# Patient Record
Sex: MALE | Race: WHITE
[De-identification: names, ages, dates, MRNs, and addresses within clinical notes are randomized per-mention and may not be internally consistent; named-entity substitution may affect disease eponyms.]

---

## 2015-09-14 VITALS
SYSTOLIC BLOOD PRESSURE: 128 MMHG | DIASTOLIC BLOOD PRESSURE: 86 MMHG | SYSTOLIC BLOOD PRESSURE: 128 MMHG | SYSTOLIC BLOOD PRESSURE: 128 MMHG | DIASTOLIC BLOOD PRESSURE: 86 MMHG | SYSTOLIC BLOOD PRESSURE: 128 MMHG | DIASTOLIC BLOOD PRESSURE: 86 MMHG | DIASTOLIC BLOOD PRESSURE: 86 MMHG | SYSTOLIC BLOOD PRESSURE: 128 MMHG | DIASTOLIC BLOOD PRESSURE: 86 MMHG | DIASTOLIC BLOOD PRESSURE: 86 MMHG | DIASTOLIC BLOOD PRESSURE: 86 MMHG | DIASTOLIC BLOOD PRESSURE: 86 MMHG | DIASTOLIC BLOOD PRESSURE: 86 MMHG | DIASTOLIC BLOOD PRESSURE: 86 MMHG | SYSTOLIC BLOOD PRESSURE: 128 MMHG | SYSTOLIC BLOOD PRESSURE: 128 MMHG | DIASTOLIC BLOOD PRESSURE: 86 MMHG | SYSTOLIC BLOOD PRESSURE: 128 MMHG | DIASTOLIC BLOOD PRESSURE: 86 MMHG | SYSTOLIC BLOOD PRESSURE: 128 MMHG | DIASTOLIC BLOOD PRESSURE: 86 MMHG | DIASTOLIC BLOOD PRESSURE: 86 MMHG | DIASTOLIC BLOOD PRESSURE: 86 MMHG | SYSTOLIC BLOOD PRESSURE: 128 MMHG | DIASTOLIC BLOOD PRESSURE: 86 MMHG | SYSTOLIC BLOOD PRESSURE: 128 MMHG | SYSTOLIC BLOOD PRESSURE: 128 MMHG | SYSTOLIC BLOOD PRESSURE: 128 MMHG | SYSTOLIC BLOOD PRESSURE: 128 MMHG | SYSTOLIC BLOOD PRESSURE: 128 MMHG | DIASTOLIC BLOOD PRESSURE: 86 MMHG | SYSTOLIC BLOOD PRESSURE: 128 MMHG | SYSTOLIC BLOOD PRESSURE: 128 MMHG

## 2017-01-20 ENCOUNTER — HOSPITAL ENCOUNTER (OUTPATIENT)
Dept: HOSPITAL 61 - MRI | Age: 52
Discharge: HOME | End: 2017-01-20
Attending: PHYSICIAN ASSISTANT
Payer: COMMERCIAL

## 2017-01-20 DIAGNOSIS — M25.78: ICD-10-CM

## 2017-01-20 DIAGNOSIS — M51.27: ICD-10-CM

## 2017-01-20 DIAGNOSIS — M51.37: Primary | ICD-10-CM

## 2017-01-20 DIAGNOSIS — M47.896: ICD-10-CM

## 2017-01-20 PROCEDURE — 72148 MRI LUMBAR SPINE W/O DYE: CPT

## 2017-01-20 NOTE — RAD
PROCEDURE 

MRI lumbar spine without contrast. 

 

HISTORY 

Low back pain with left leg pain for 2 weeks 

 

TECHNIQUE 

Sagittal and axial T1 and T2 and sagittal STIR images were acquired of the

lumbar spine. 

Contrast: None 

 

COMPARISON 

September 3, 2014 

 

FINDINGS 

There is some motion degradation. Lumbar vertebral body stature and AP 

alignment are preserved. There is again mild degenerative disc disease at 

L5-S1, mild disc desiccation at L4-5. Conus terminates normally T12-L1. 

There is no significant focal marrow edema. There is mild lumbar 

levoscoliosis. There are anterior annular tears L2-3 to L4-5, posterior 

annular tear L5-S1. 

L2-3: Neural foramina and spinal canal are adequate. 

L3-4: Spinal canal and neural foramina are adequate. There is mild 

buckling of the ligamentum flavum and facet degenerative change. 

L4-5: There is again minimal disc osteophyte complex and bulge. Spinal 

canal and neural foramina are adequate. 

L5-S1: There is again shallow posterior mostly central protrusion, no 

significant impingement of the descending S1 nerve roots. Spinal canal is 

overall adequate. Neural foramina are adequate. 

 

IMPRESSION 

1. Findings are similar comparing with 2014 exam. There is again shallow 

posterior protrusion at L5-S1 without significant neural impingement or 

spinal stenosis. There is mild degenerative disc disease at L5-S1 and 

L4-5, very mild spondylosis.

 

 

 

Electronically signed by: Leandro Denny MD (Jan 20, 2017 13:34:28)

## 2017-02-10 ENCOUNTER — HOSPITAL ENCOUNTER (OUTPATIENT)
Dept: HOSPITAL 61 - PNCL | Age: 52
Discharge: HOME | End: 2017-02-10
Attending: ANESTHESIOLOGY
Payer: COMMERCIAL

## 2017-02-10 DIAGNOSIS — M51.16: Primary | ICD-10-CM

## 2017-02-10 PROCEDURE — 62323 NJX INTERLAMINAR LMBR/SAC: CPT

## 2017-02-11 NOTE — PAIN
DATE OF SERVICE:  02/10/2017



DIAGNOSES:  Lumbar radiculopathy, lumbar herniated disk and lumbar degenerative

disk disease.



HISTORY OF PRESENT ILLNESS:  The patient is a 51-year-old male who returns for a

followup status post lumbar epidural steroid injections, last seen in August 2015.  The patient did very well with these with near 80% improvement, but the

pain has returned now, over the past several weeks, he was lifting an object off

the floor, bending over forward.  When he picked it up, he felt something pop in

his back and had pain radiating to his left lower extremity as it was previously

when he lifted a bag of salt also and this aggravated the pain as well about a

week ago.  The patient reports it is across the low back into the left posterior

gluteus, posterior thigh, posterior calf, into the foot on the left side.  The

patient reports it is a 7 on a scale of 10, aching and dull with shooting pain,

radiating pain and some numbness in the leg, also some weakness with ambulation

and standing at work.  The patient reports no symptoms in the right lower

extremity currently but across the low back bilaterally.  The patient did have

an MRI scan of the lumbar spine dated 01/20/2017 showing similar finding

compared with the 2014 exam, ____ protrusion at L5-S1 without significant nerve

impingement or spinal stenosis, mild degenerative disease at L5-S1 and L4-L5

with spinal canal and neuroforamen of adequate L4-L5 shows minimal disk

osteophyte complex and bulge as well.



PHYSICAL EXAMINATION:

VITAL SIGNS:  The patient's blood pressure is 148/56, pulse 87, respirations 16,

temperature is 98.2 degrees Fahrenheit, height 6 feet 4 inches, weight 277

pounds.

GENERAL:  The patient is awake, alert, oriented, appropriate with very pleasant

demeanor.

HEENT:  Shows normocephalic, atraumatic.  Extraocular movements are intact and

symmetrical.  Oral cavity, mucous membranes moist and pink.  Dentition intact.

NECK:  Supple, without palpable lymphadenopathy.  Swallow reflexes are

symmetrical.

CHEST:  Shows normal infection, breath sounds are clear to auscultation

bilaterally.

CARDIOVASCULAR:  S1, S2, clear.

ABDOMEN:  The patient's abdomen is obese, soft, nontender, nondistended.  No

hepatosplenomegaly, no rebound or guarding demonstrated.

BACK:  Spine grossly midline.  Normal appearing thoracic kyphosis and lumbar

lordotic curvature.  Lumbar paraspinal musculature shows symmetrical on

inspection with some moderate tenderness in the middle and lower distribution of

paraspinous muscles bilaterally but symmetrical without evidence of atrophy or

hypertrophy.  No tenderness over the spinous processes.  No tenderness over the

sacrum or sacroiliac regions.  The patient showed good rotation of motion in

lumbar spine both laterally greater than 10 degrees right and left as well as

extension greater than 10 degrees, forward flexion 45 degrees without

difficulty.

EXTREMITIES:  The lower extremities showed deep tendon reflexes 2+ in the

patella and 1+ tendo calcaneus tendons.  Motor exam is strong with 5/5

dorsiflexion and extension and equal bilaterally as quadriceps and hamstrings

flexion.  Peripheral pulses are 1+, posterior tibial and dorsalis pedis pulses. 

No peripheral edema is noted.  No clubbing, no cyanosis.  Lower extremities are

warm and dry to touch.  Equal in color and appearance.  Straight leg raising is

noted to be positive on the left at about 35 to 40 degrees with pain radiating

to posterior gluteus, posterior thigh, which has decreased but no really knee

flexion right side is negative.  ____ are negative bilaterally.  The patient is

able to stand, stand on his toes without difficulty, has no loss of balance.  He

is walking with a normal-appearing gait for short distances ____ not using any

assistive devices.  Options were discussed with the patient.  The patient's old

chart was reviewed, as his current medication regimen and updated.



REVIEW OF SYSTEMS:  Current review of systems updated today as well.  We will

proceed with a lumbar epidural steroid injection.  He has done very well with

these in the past.  Risks were again discussed including but not limited to

bleeding, infection, possibility of epidural hematoma, subsequent neurologic

compromise, dural puncture headaches, spinal cord and/or nerve damage, side

effects of steroid medications and poor results regarding pain control.  The

patient understands and wishes to proceed.  He should return to clinic in

approximately 2 weeks for followup.  He was counseled on return appointment,

activity level and side effects to be aware of.



DIAGNOSES:  Lumbar radiculopathy with lumbar herniated disk and lumbar

degenerative disk disease.



PROCEDURE:  Lumbar epidural steroid injection, translaminar approach to the

L5-S1 level using C-arm fluoroscopic guidance under sterile prep and drape using

local anesthetic.  



MEDICATIONS INJECTED:  120 mg Depomedrol, plus 10 mL of preservative free normal

saline and 2 mL of Isovue contrast.



Condition at discharge is stable.  The patient tolerated procedure well, had no

complications.

 



______________________________

CINTIA LAGUNA MD



DR:  NEDA/eber  JOB#:  [id number] / [id number]

DD:  02/10/2017 08:10  DT:  02/11/2017 09:53

## 2017-02-24 ENCOUNTER — HOSPITAL ENCOUNTER (OUTPATIENT)
Dept: HOSPITAL 61 - PNCL | Age: 52
End: 2017-02-24
Attending: ANESTHESIOLOGY
Payer: COMMERCIAL

## 2017-02-24 DIAGNOSIS — M51.16: Primary | ICD-10-CM

## 2017-02-24 PROCEDURE — 62323 NJX INTERLAMINAR LMBR/SAC: CPT

## 2017-02-25 NOTE — PAIN
DATE OF SERVICE:  02/24/2017



PROGRESS NOTE



DIAGNOSES:  Lumbar radiculopathy with lumbar herniated disc and lumbar

degenerative disc disease.



HISTORY OF PRESENT ILLNESS:  The patient is a 51-year-old male who returns for

followup status post lumbar epidural steroid injection x 1.  The patient reports

about 40% improvement after the injection.  Has still some pain in the low back

and left leg, but much improved.  The patient is increasing his activity with

greater ease and comfort.  Still has some pain at the end of his working day,

but during the day, he is doing fairly well.  The patient reports his pain as a

7 on a scale of 10 at its worst, but it is only at the end of the day and has

only been present for about 3 or 4 days at that level, as he did very well for

the first week and a half or so after his injection.  The patient reports no new

motor or sensory deficits, no new bowel or bladder incontinence or other

complaints.  He described his pain as sharp, alternating with dull and aching,

radiating to the left posterior gluteus, posterior lateral thigh, posterior calf

as previously.



PHYSICAL EXAMINATION:

VITAL SIGNS:  Today, the patient's blood pressure is 141/83, pulse 90,

respirations 18, temperature 98.0 degrees Fahrenheit, height 6 feet 4 inches,

weight is 270 pounds.

GENERAL:  The patient is awake, alert, oriented, appropriate, very pleasant

demeanor.

HEENT:  Shows normocephalic and atraumatic.  Extraocular movements are intact

and symmetrical.  Oral cavity shows mucous membranes are moist and pink. 

Dentition is intact.

NECK:  Shows anterior throat supple without palpable lymphadenopathy noted. 

Swallow reflex is symmetrical.

CHEST:  Shows normal with inspection.  Breath sounds are clear to auscultation

bilaterally.

HEART:  Shows S1 and S2 clear.  No murmurs auscultated.

ABDOMEN:  Soft, nontender, nondistended.

BACK:  Shows spine grossly midline.  Lumbar paraspinous muscle shows

symmetrical.  No significant tenderness with palpation, only diffusely tender in

the low lumbar distribution bilaterally without radiation.  The patient shows

full rotational motion of the lumbar spine, both laterally as well as extension

and flexion without difficulty.  No tenderness over the sacrum and sacroiliac

regions with palpation.

EXTREMITIES:  Lower extremities show deep tendon reflexes at 2+ in the patellar,

1+ tendo-calcaneus tendons, are equal.  Motor exam is strong with 5/5

dorsiflexion, extension, quadriceps and hamstring flexion bilaterally.



PLAN:  Options were discussed with the patient at this time, the patient's old

chart was reviewed as his current medication regimen and updated.  Current

review of systems updated today as well.  We will proceed with a second lumbar

epidural steroid injection today with fluoroscopic guidance.  Risks were again

discussed including but not limited to bleeding, infection, possibility of

epidural hematoma, subsequent neurologic compromise, dural puncture, headaches,

spinal cord and/or nerve damage, side effects of steroid medication and poor

results regarding pain control.  The patient understands and wishes to proceed. 

The patient will return to clinic in approximately 2 weeks for followup.  He is

counseled on return appointment, activity level, and side effects to be aware

of.



DIAGNOSES:  Lumbar radiculopathy with lumbar herniated disc and lumbar

degenerative disk disease.



PROCEDURES:  Lumbar epidural steroid injection in translaminar approach at the

L5-S1 level using C-arm fluoroscopic guidance under sterile prep and drape using

local anesthetic.



MEDICATIONS INJECTED:  Depo-Medrol 120 mg plus 10 mL of preservative-free normal

saline and 2 mL of Isovue for contrast.



CONDITION AT DISCHARGE:  Stable.  The patient tolerated the procedure well, had

no complications.

 



______________________________

CINTIA LAGUNA MD



DR:  NEDA/nts  JOB#:  [id number] / [id number]

DD:  02/24/2017 08:09  DT:  02/25/2017 03:33

## 2017-04-05 ENCOUNTER — HOSPITAL ENCOUNTER (OUTPATIENT)
Dept: HOSPITAL 63 - DXRADRC | Age: 52
Discharge: HOME | End: 2017-04-05
Attending: PHYSICIAN ASSISTANT
Payer: COMMERCIAL

## 2017-04-05 DIAGNOSIS — M25.562: Primary | ICD-10-CM

## 2017-04-05 PROCEDURE — 73562 X-RAY EXAM OF KNEE 3: CPT

## 2017-04-05 NOTE — RAD
Left knee radiographs 



History:  Left knee pain for one day, no known injury.



Comparison:  None.



Findings:  



AP, lateral, and oblique views of the left knee, performed weightbearing. No

acute fracture or dislocation is identified. No joint effusion is seen.

Minimal tricompartment degeneration is present.



Impression:  

Minimal degeneration. No acute osseous abnormality identified.

## 2017-04-21 ENCOUNTER — HOSPITAL ENCOUNTER (OUTPATIENT)
Dept: HOSPITAL 61 - PNCL | Age: 52
Discharge: HOME | End: 2017-04-21
Attending: ANESTHESIOLOGY
Payer: COMMERCIAL

## 2017-04-21 DIAGNOSIS — M51.16: Primary | ICD-10-CM

## 2017-04-21 PROCEDURE — 62323 NJX INTERLAMINAR LMBR/SAC: CPT

## 2017-04-22 NOTE — PN
DATE:  04/21/2017



PROGRESS NOTE FOR PAIN CLINIC



DIAGNOSES:  Lumbar radiculopathy with lumbar herniated disk, lumbar degenerative

disk disease.



HISTORY OF PRESENT ILLNESS:  The patient is a 52-year-old male who returns for

followup status post lumbar epidural steroid injections x 2.  The patient

reports about 40% improvement overall, was initially doing much better, but the

pain is returning now in the low back and into his left lower extremity,

initially it was the right one, the right has cleared up now with significant

pain in his left leg as he had on his last visit.  The patient reports anywhere

from a 5 to 8 on scale of 10, it is burning, it is sharp, aching and dull, worse

with standing and walking, changing positions, better with sitting or lying

down, still sleeping well though without any difficulty sleeping through the

night.  The patient reports no new motor or sensory deficits, no new bowel or

bladder incontinence or other complaints, but still significant pain in the low

back, left leg, mostly in the posterior gluteus, posterior thigh, posterior

lower leg to the ankle on the left side only.



PHYSICAL EXAMINATION:

VITAL SIGNS:  Today, the patient's blood pressure is 129/79, pulse 90,

respirations 20, temperature 97.8 degrees Fahrenheit, height 6 feet 4 inches,

weighs 276 pounds.

GENERAL:  The patient is awake, alert, oriented, appropriate, has a very

pleasant demeanor.

HEENT:  Head shows normocephalic, atraumatic.  Extraocular movements are intact,

symmetrical.  Oral cavity has mucous membranes moist and pink.  Dentition is

intact.

NECK:  Shows anterior throat supple without palpable lymphadenopathy noted. 

Swallow reflex is symmetrical.

CHEST:  Shows normal on inspection.  Breath sounds are clear to auscultation

bilaterally.

HEART:  Shows S1 and S2 clear.

ABDOMEN:  Soft, nontender, nondistended.  No palpable organomegaly is noted.  No

rebound or guarding demonstrated.

BACK:  The patient's back shows spine grossly in the midline.  Lumbar

paraspinous muscle shows some moderate tenderness with palpation bilaterally in

the lower lumbar distribution, but only diffusely without atrophy, hypertrophy

or asymmetry.  No radiation of pain, no tenderness over the sacrum or sacroiliac

regions.  The patient shows good rotational motion of the lumbar spine, both

laterally greater than 10 degrees right and left as well as extension greater

than 10 degrees, forward flexion 45 degrees without exacerbation of pain.

EXTREMITIES:  The patient's lower extremities showed deep tendon reflexes at 2+

in the patellar, 1+ tendo-calcaneus tendons are symmetrical.  Motor exam is

strong with 5/5 dorsiflexion and extension as well as quadriceps and hamstring

flexion and symmetrical.



Options were discussed with the patient and the patient's old chart was reviewed

as his current medication regimen updated.  Current review of systems updated

today as well and we will proceed with a third in the series of lumbar epidural

steroid injection with fluoroscopic guidance.  Risks were again discussed

including, but not limited to bleeding, infection, possibility of epidural

hematoma, subsequent neurologic compromise, dural puncture, headaches, spinal

cord and/or nerve damage, side effects of steroid medication and poor results

regarding pain control.  The patient understands and wishes to proceed.  The

patient will return to clinic in approximately 2 weeks for followup, was

counseled on return appointment, activity level and side effects to be aware of.

 We also discussed physical therapy and weight loss with the patient.  He is

interested in both these.  We will make some arrangements for some pool therapy,

also some weight loss training resources were shared with him.



DIAGNOSIS:  Lumbar radiculopathy with lumbar degenerative disk disease and

lumbar herniated disk.



PROCEDURE:  Lumbar epidural steroid injection in translaminar approach at L5-S1

level using C-arm fluoroscopic guidance under sterile prep and drape using local

anesthetic.  Medication injected is 120 mg Depo-Medrol plus 10 mL of

preservative-free normal saline and 2 mL Isovue for contrast.



CONDITION AT DISCHARGE:  Stable.  The patient tolerated procedure well, had no

complications.

 



______________________________

CINTIA LAGUNA MD



DR:  NEDA/eber  JOB#:  [id number] / [id number]

DD:  04/21/2017 08:41  DT:  04/22/2017 00:20

## 2019-02-06 ENCOUNTER — HOSPITAL ENCOUNTER (OUTPATIENT)
Dept: HOSPITAL 61 - PNCL | Age: 54
Discharge: HOME | End: 2019-02-06
Attending: ANESTHESIOLOGY
Payer: COMMERCIAL

## 2019-02-06 DIAGNOSIS — M51.16: Primary | ICD-10-CM

## 2019-02-06 PROCEDURE — 62323 NJX INTERLAMINAR LMBR/SAC: CPT

## 2019-02-06 NOTE — PAIN
DATE OF SERVICE:  02/06/2019



PROGRESS NOTE FOR PAIN CLINIC



DIAGNOSES:  Lumbar radiculopathy with lumbar herniated disk and lumbar

degenerative disk disease.



HISTORY OF PRESENT ILLNESS:  The patient is a 53-year-old male who returns for

followup status post lumbar epidural steroid injections x 3, last seen in April of 2017.  The patient did very well with about a 75% improvement.  The patient

reports the pain is returning now over the past few months in the low back,

bilateral lower extremities, mostly in the left, but in the posterior gluteus,

posterior thighs bilaterally to the knees and into the posterior calf, some on

the lateral right side as well.  The patient reports it is worse with walking,

standing, change in positions, initially with increasing activity, walking,

doing work activities, household activities, traveling better, sleeping better,

reports it awakens him from sleep about every 5-6 hours now.  The patient

reports pain is 9 on a scale of 10 at its worst, 8 on average, 6 at its least. 

Described as sharp and constant, becoming more unbearable, radiating, shooting

and aching pain across the back.  The patient reports no new motor or sensory

deficits and no new bowel or bladder incontinence or other complaints.



PHYSICAL EXAMINATION:

VITAL SIGNS:  The patient's blood pressure 131/99, pulse 87, respirations 18 and

temperature 97.6 degrees Fahrenheit.  Height 6 feet 4 inches and weight is 271

pounds.

GENERAL:  The patient is awake, alert, oriented, appropriate and very pleasant

demeanor.

HEENT:  Head shows normocephalic and atraumatic.  Extraocular movements are

intact and symmetrical.  Oral cavity:  Mucous membranes moist and pink. 

Dentition is intact.

NECK:  Shows anterior throat supple without palpable lymphadenopathy noted. 

Swallow reflex symmetrical.

CHEST:  Shows normal with inspection.  Breath sounds clear to auscultation

bilaterally.

HEART:  Shows S1 and S2 clear.  No murmurs auscultated.

ABDOMEN:  Soft, nontender and nondistended.  No palpable organomegaly is noted. 

No rebound or guarding demonstrated.

BACK:  Shows spine grossly in the midline.  Normal appearing thoracic kyphosis

and lumbar lordotic curvature.  Lumbar paraspinous muscle shows symmetrical on

inspection and on palpation shows some moderate tenderness diffusely bilaterally

but only diffusely without radiation.  The patient has good rotational motion of

the lumbar spine, both laterally greater than 10 degrees right and left as well

as extension greater than 10 degrees, forward flexion 45 degrees without

significant pain reported.

EXTREMITIES:  The patient's lower extremities show deep tendon reflexes 2+ in

the patellar, 1+ tendo-calcaneus tendons.  Motor exam is strong with 5/5

dorsiflexion, extension, quadriceps and hamstring flexion equal.  Peripheral

pulses are 1+ posterior tibial.  No peripheral edema is noted bilaterally.



Options were discussed with the patient.  The patient's old chart was reviewed

as well as his current medication regimen updated.  Current review of systems

updated today as well and we will proceed with a first in the series of lumbar

epidural steroid injection today with fluoroscopic guidance.  Risks were again

discussed including, but not limited to bleeding, infection, possibility of

epidural hematoma, subsequent neurological compromise, dural puncture,

headaches, spinal cord and/or nerve damage, side effects of steroid medication

and poor results regarding pain control.  The patient understands and wished to

proceed.  The patient will return to the clinic in approximately 2 weeks for

followup, was counseled as to return appointment, activity level and side

effects to be aware of.



DIAGNOSES:  Lumbar radiculopathy with lumbar herniated disk and lumbar

degenerative disk disease.  



PROCEDURE:  Lumbar epidural steroid injection, translaminar approach, L5-S1

level using C-arm fluoroscopic guidance under sterile prep and drape using local

anesthetic.



MEDICATION INJECTED:  A total of 120 mg Depo-Medrol plus 10 mL of

preservative-free normal saline and 2 mL of Isovue for contrast. 



CONDITION AT DISCHARGE:  Stable.  The patient tolerated procedure well and had

no complications.

 



______________________________

CINTIA LAGUNA MD



DR:  NEDA/eber  JOB#:  [id number] / [id number]

DD:  02/06/2019 08:27  DT:  02/06/2019 09:01

## 2019-02-27 ENCOUNTER — HOSPITAL ENCOUNTER (OUTPATIENT)
Dept: HOSPITAL 61 - PNCL | Age: 54
Discharge: HOME | End: 2019-02-27
Attending: ANESTHESIOLOGY
Payer: COMMERCIAL

## 2019-02-27 DIAGNOSIS — M51.16: Primary | ICD-10-CM

## 2019-02-27 PROCEDURE — 62323 NJX INTERLAMINAR LMBR/SAC: CPT

## 2019-02-27 NOTE — PAIN
DATE OF SERVICE:  02/27/2019



DIAGNOSES:  Lumbar radiculopathy with lumbar degenerative disk disease and

lumbar herniated disk.



HISTORY OF PRESENT ILLNESS:  The patient is a 53-year-old male who returns for

followup status post lumbar epidural steroid injection x 1.  The patient reports

about 40% improvement overall, doing much better with this.  Pain is low back,

bilateral lower extremities.  Pain is still radiating into the posterior

gluteus, posterior thighs bilaterally as well as lateral anterior thigh.  The

patient reports it is a 6 on a scale 10 at its least, a 7 on its worst, 7 on

average.  The patient reports aching, dull, shooting, radiating pain, worse with

standing and walking, again much better after the last injection.  The patient

reports for several weeks, he was doing better with work activities, doing

greater distance walking, household activity as well and sleeping much better. 

The patient reports it does not awaken him from sleep now, which it was

previously.  The patient reports no new motor or sensory deficits, no new bowel

or bladder incontinence or other complaints.  The patient is quite pleased with

his progress thus far.  He has been increasing his activity, although the pain

is beginning to return slightly and still about 40%, overall improved.



PHYSICAL EXAMINATION:

VITAL SIGNS:  The patient's blood pressure 119/81, pulse 87, respirations 16,

temperature is 97.5 degrees, height 6 feet 4 inches, weight is 274 pounds.

GENERAL:  The patient is awake, alert, oriented, appropriate, very pleasant

demeanor.

HEENT:  Head shows normocephalic, atraumatic.  Extraocular muscles are intact

and symmetrical.  Oral cavity:  Mucous membranes moist and pink.  Dentition is

intact.

NECK:  Shows anterior throat supple without palpable lymphadenopathy noted. 

Swallow reflex is symmetrical.

CHEST:  Shows normal on inspection.  Breath sounds clear to auscultation

bilaterally.

HEART:  Shows S1, S2 clear.  No murmurs auscultated.

ABDOMEN:  Soft, nontender, nondistended.  No palpable organomegaly is noted.  No

rebound or guarding demonstrated.

BACK:  Shows spine grossly in the midline.  Normal appearing thoracic kyphosis

and lumbar lordotic curvature.  Lumbar paraspinous musculature shows symmetrical

on inspection, on palpation shows some moderate tenderness but only diffusely

throughout the middle and lower distribution of paraspinous muscles without

radiation.  No trigger points.  The patient has good rotation both laterally as

well as extension and flexion without difficulty.

EXTREMITIES:  Lower extremities show deep tendon reflexes at 2+ in the patellar,

1+ tendo calcaneus tendons.  Motor exam is strong with 5/5 dorsiflexion,

extension, quadriceps and hamstring flexion and equal.  Peripheral pulses are 1+

posterior tibial.  No peripheral edema is noted.



Options were discussed with the patient.  The patient's old chart was reviewed

as his current medication regimen and updated.  Current review of systems is

updated today as well.  We will proceed with a second in the series of lumbar

epidural steroid injection today with fluoroscopic guidance.  Risks were again

discussed including, but not limited to bleeding, infection, possibility of

epidural hematoma, subsequent neurological compromise, dural puncture,

headaches, spinal cord and/or nerve damage, side effects of steroid medication

and poor results regarding pain control.  The patient understands and wished to

proceed.  The patient to return to clinic in approximately 2 weeks for followup,

was counseled on return appointment, activity level and side effects to be aware

of.



DIAGNOSES:  Lumbar radiculopathy with lumbar degenerative disk disease, lumbar

herniated disk.



PROCEDURE:  Lumbar epidural steroid injection, translaminar approach at L5-S1

level using C-arm fluoroscopic guidance under sterile prep and drape using local

anesthetic.



MEDICATION INJECTED:  A total of 120 mg Depo-Medrol plus 10 mL of preservative

free normal saline, 2 mL of Isovue for contrast.



CONDITION AT DISCHARGE:  Stable.  The patient tolerated procedure well, had no

complications.

 



______________________________

CINTIA LAGUNA MD



DR:  NEDA/eber  JOB#:  [id number] / [id number]

DD:  02/27/2019 08:23  DT:  02/27/2019 19:34

## 2019-07-18 ENCOUNTER — HOSPITAL ENCOUNTER (OUTPATIENT)
Dept: HOSPITAL 63 - PMG | Age: 54
Discharge: HOME | End: 2019-07-18
Payer: COMMERCIAL

## 2019-07-18 DIAGNOSIS — K56.41: Primary | ICD-10-CM

## 2019-07-18 PROCEDURE — 74019 RADEX ABDOMEN 2 VIEWS: CPT

## 2019-07-18 NOTE — RAD
AP supine and upright views of the abdomen

 

Clinical indications: Abdominal pain.

 

FINDINGS: There is moderate fecal retention within the right side of the 

colon. Mild fecal retention is seen with left side of the colon. No 

obstructive bowel pattern is evident. No air-fluid levels are seen. No 

free intraperitoneal air is seen.

 

IMPRESSION: No acute abnormality.

 

Electronically signed by: Vincent Fuchs MD (7/18/2019 4:21 PM) [id number]

## 2019-12-10 ENCOUNTER — HOSPITAL ENCOUNTER (OUTPATIENT)
Dept: HOSPITAL 61 - PNCL | Age: 54
End: 2019-12-10
Attending: ANESTHESIOLOGY
Payer: COMMERCIAL

## 2019-12-10 DIAGNOSIS — M51.16: Primary | ICD-10-CM

## 2019-12-10 PROCEDURE — 62323 NJX INTERLAMINAR LMBR/SAC: CPT

## 2019-12-10 NOTE — PAIN
DATE OF SERVICE:  12/10/2019



PROGRESS NOTE FOR PAIN CLINIC



DIAGNOSES:  Lumbar radiculopathy with lumbar herniated disk, lumbar degenerative

disk disease.



HISTORY OF PRESENT ILLNESS:  The patient is a 54-year-old male who returns for

followup status post lumbar epidural steroid injections x 3, last seen on

05/28/2019.  The patient did very well with about 50% improvement overall.  The

patient reports the pain began to return after about the past month or month and

a half in the low back and bilateral lower extremities, posterior gluteus,

radiating to posterior thighs, posterior calves, slightly worse on the left than

the right, but present bilaterally.  The patient reports it is an 8 on a scale

of 10 at its worst, 6 on average, 4 at its least over the past week and is a 4

today.  The patient reports it is aching, sharp, tingling, radiating, worse with

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 

He is driving daily, which exacerbated the pain.  The patient reports meloxicam

helped significantly.  He is requesting a refill of this as well.  The patient

reports no new motor or sensory deficits, no new bowel or bladder incontinence

or other complaints.



PHYSICAL EXAMINATION:

VITAL SIGNS:  The patient's blood pressure 125/80, pulse 74, respirations 18,

temperature 97.6 degrees Fahrenheit, height is 6 feet 4 inches, weight is 270

pounds.

GENERAL:  The patient is awake, alert, oriented, appropriate, very pleasant

demeanor.

HEENT:  Shows normocephalic, atraumatic.  Extraocular movements are intact and

symmetrical.  Oral cavity:  Mucous membranes moist and pink.  Dentition is

intact.

NECK:  Shows anterior throat supple without palpable lymphadenopathy noted. 

Swallow reflex symmetrical.

CHEST:  Shows normal on inspection.  Breath sounds clear to auscultation

bilaterally.

HEART:  Shows S1, S2 clear.  No murmurs auscultated.

ABDOMEN:  Soft, nontender, nondistended.  No palpable organomegaly is noted.  No

rebound or guarding demonstrated.

BACK:  Shows spine grossly in the midline.  Normal appearing thoracic kyphosis,

some minor flattening of lumbar lordotic curvature.  Lumbar paraspinous muscle

shows symmetrical on inspection; with palpation, shows some moderate tenderness

diffusely bilaterally, diffusely without significant radiation.

EXTREMITIES:  The patient's lower extremities show deep tendon reflexes at 2+

patellar, 1+ tendo-calcaneus tendons.  Motor exam is strong with 5/5

dorsiflexion, extension, quadriceps and hamstring flexion and equal bilaterally.

 Peripheral pulses are 1+ posterior tibia.  No peripheral edema is noted

bilaterally.



Options were discussed with the patient.  The patient's old chart was reviewed

as his current medication regimen updated.  Current review of systems updated

today as well.  We will proceed with a lumbar epidural steroid injection today

with fluoroscopic guidance.  Risks were again discussed including, but not

limited to bleeding, infection, possibility of epidural hematoma, subsequent

neurological compromise, dural puncture, headaches, spinal cord and/or nerve

damage, side effects of steroid medication and poor results regarding pain

control.  The patient understands and wished to proceed.  The patient will

return to clinic in approximately 2 weeks for followup.  He was counseled on

return appointment, activity level and side effects to be aware of.



DIAGNOSES:  Lumbar radiculopathy with lumbar degenerative disk disease and

lumbar herniated disk.



PROCEDURE:  Lumbar epidural steroid injection, translaminar approach L5-S1 level

using C-arm fluoroscopic guidance under sterile prep and drape using local

anesthetic.



MEDICATION INJECTED:  A total of 120 mg Depo-Medrol plus 10 mL of

preservative-free normal saline and 2 mL of contrast.



CONDITION AT DISCHARGE:  Stable.  The patient tolerated the procedure well, had

no complications.

 



______________________________

CINTIA LAGUNA MD



DR:  NEDA/eber  JOB#:  [id number] / [id number]

DD:  12/10/2019 08:50  DT:  12/10/2019 09:04

## 2019-12-26 ENCOUNTER — HOSPITAL ENCOUNTER (OUTPATIENT)
Dept: HOSPITAL 61 - PNCL | Age: 54
End: 2019-12-26
Attending: ANESTHESIOLOGY
Payer: COMMERCIAL

## 2019-12-26 DIAGNOSIS — M51.16: Primary | ICD-10-CM

## 2019-12-26 PROCEDURE — 62323 NJX INTERLAMINAR LMBR/SAC: CPT

## 2019-12-26 NOTE — PAIN
DATE OF SERVICE:  12/26/2019



PROGRESS NOTE FOR PAIN CLINIC



DIAGNOSES:  Lumbar radiculopathy with lumbar herniated disk, lumbar degenerative

disk disease.



HISTORY OF PRESENT ILLNESS:  The patient is a 54-year-old male who returns for

followup status post lumbar epidural steroid injection x 1 in this series.  The

patient reports it is about 25% improvement overall in the low back, bilateral

lower extremities.  The patient reports the pain is more noticeable now on the

left side, the posterior gluteus, posterior thigh and posterior calf, radiating

some on the right as well and across the low back, but mostly on the left side. 

The patient reports it is worse with walking, standing or changing positions, is

better with sitting or lying down initially, though he is doing much better with

walking distances, doing work activities, household activities, sleeping better

at night.  Reports it does not awaken him from sleep at night.  The patient

describes the pain as burning in the back, aching and shooting in the lower

extremities as described.  The patient reports it is a 9 on a scale of 10 at its

worst in the past week, 7 on average, 5 at its least and is a 7 today.  The

patient reports no new motor or sensory deficits, no new bowel or bladder

incontinence or other complaints.



PHYSICAL EXAMINATION:

VITAL SIGNS:  The patient's blood pressure is 121/79, pulse 86, respirations 18,

temperature 98.1 degrees Fahrenheit, height 6 feet 4 inches, weight is 277

pounds.

GENERAL:  The patient is awake, alert, oriented, appropriate, very pleasant

demeanor.

HEENT:  Head shows normocephalic, atraumatic.  Extraocular movements are intact

and symmetrical.  Oral cavity shows mucous membranes moist and pink.  Dentition

is intact.

NECK:  Shows anterior throat supple without palpable lymphadenopathy noted. 

Swallow reflex symmetrical.

CHEST:  Shows normal on inspection.  Breath sounds are clear bilaterally.

HEART:  Shows S1, S2 clear.  No murmurs auscultated.

ABDOMEN:  Soft, nontender, nondistended.  No palpable organomegaly is noted.  No

rebound or guarding demonstrated.

BACK:  Shows spine grossly in the midline.  Normal-appearing thoracic kyphosis,

some minor flattening of lumbar lordotic curvature.  Lumbar paraspinous muscle

shows symmetrical on inspection, with palpation shows some moderate tenderness

diffusely, but only diffusely without significant radiation.  The patient shows

good rotational motion of lumbar spine without difficulty.

EXTREMITIES:  Lower extremities show deep tendon reflexes at 2+ in patella, 1+

tendo-calcaneus tendons.  Motor exam is strong and 5/5 in dorsiflexion,

extension, quadriceps, hamstring flexion and symmetrical.  Peripheral pulses 1+

in posterior tibia.  No peripheral edema is noted.



Options were discussed with the patient.  The patient's old chart was reviewed

as his current medication regimen updated.  Current review of systems updated

today as well.  We will proceed with a second in the series of lumbar epidural

steroid injection today with fluoroscopic guidance.  Risks were again discussed

including, but not limited to bleeding, infection, possibility of epidural

hematoma, subsequent neurological compromise, dural puncture, headaches, spinal

cord and/or nerve damage, side effects of steroid medication and poor results

regarding pain control.  The patient understands and wished to proceed.  The

patient will return to clinic in approximately 2 weeks for followup, was

counseled on return appointment, activity level and side effects to be aware of.



DIAGNOSES:  Lumbar radiculopathy with lumbar herniated disk, lumbar degenerative

disk disease.



PROCEDURE:  Lumbar epidural steroid injection, translaminar approach L5-S1 level

using C-arm fluoroscopic guidance under sterile prep and drape using local

anesthetic.



MEDICATION INJECTED:  A total of 120 mg Depo-Medrol plus 10 mL of

preservative-free normal saline and 2 mL of contrast.



CONDITION AT DISCHARGE:  Stable.  The patient tolerated procedure well, had no

complications.

 



______________________________

CINTIA LAGUNA MD



DR:  NEDA/eber  JOB#:  [id number] / [id number]

DD:  12/26/2019 08:56  DT:  12/26/2019 09:11

## 2020-02-28 ENCOUNTER — HOSPITAL ENCOUNTER (OUTPATIENT)
Dept: HOSPITAL 63 - PMG | Age: 55
Discharge: HOME | End: 2020-02-28
Attending: NURSE PRACTITIONER
Payer: COMMERCIAL

## 2020-02-28 DIAGNOSIS — M79.601: Primary | ICD-10-CM

## 2020-02-28 PROCEDURE — 73090 X-RAY EXAM OF FOREARM: CPT

## 2020-02-28 PROCEDURE — 73060 X-RAY EXAM OF HUMERUS: CPT

## 2020-02-28 NOTE — RAD
Indications: Right arm for 5 days. No known injury.

 

Two-view right humerus study: No acute fracture or dislocation or lytic 

process is seen.

 

Two-view right forearm study: No acute fracture or dislocation or lytic 

process is seen.

 

IMPRESSION: No acute osseous abnormality.

 

Electronically signed by: Vincent Fuchs MD (2/28/2020 1:38 PM) [id number]

## 2020-02-28 NOTE — RAD
Indications: Right arm for 5 days. No known injury.

 

Two-view right humerus study: No acute fracture or dislocation or lytic 

process is seen.

 

Two-view right forearm study: No acute fracture or dislocation or lytic 

process is seen.

 

IMPRESSION: No acute osseous abnormality.

 

Electronically signed by: Vincent Fuchs MD (2/28/2020 1:38 PM) [id number]

## 2020-03-18 ENCOUNTER — HOSPITAL ENCOUNTER (OUTPATIENT)
Dept: HOSPITAL 61 - KCIC MRI | Age: 55
Discharge: HOME | End: 2020-03-18
Attending: ORTHOPAEDIC SURGERY
Payer: COMMERCIAL

## 2020-03-18 DIAGNOSIS — M77.11: Primary | ICD-10-CM

## 2020-03-18 PROCEDURE — 73221 MRI JOINT UPR EXTREM W/O DYE: CPT

## 2020-03-18 NOTE — KC[facility]
EXAM: MRI RIGHT ELBOW

 

DATE: 3/18/2020 3:30 PM

 

CLINICAL INDICATION: Lateral right elbow pain

 

COMPARISON: None.

 

TECHNIQUE: Multiplanar, multisequence MR imaging of the right elbow was 

performed without IV contrast.

 

 

FINDINGS:

No significant elbow joint effusion.

 

T1 marrow signal is grossly preserved. No evidence for fracture or 

osteonecrosis. Articular cartilage is grossly preserved without definite 

full-thickness cartilage defect

 

Common flexor tendon origin is grossly normal in signal and morphology. 

The ulnar collateral ligament is intact.

 

There is mild thickening and increased signal within the common extensor 

tendon origin as well as overlying soft tissues. Mild edema is also seen 

within the likely extensor digitorum muscle. The radial collateral 

ligament and lateral ulnar collateral ligament are grossly intact. Annular

ligament is not well seen.

 

Biceps and brachialis tendons are intact with normal signal and morphology

without significant bicipitoradial bursal fluid.

 

Triceps tendon is intact with normal signal and morphology. 

 

IMPRESSION:

1.  Changes of lateral epicondylitis with tendinosis of the common 

extensor tendon group and mild associated soft tissue edema. Edema within 

the extensor digitorum muscle likely low-grade strain.

 

Electronically signed by: Danie Montemayor MD (3/18/2020 4:53 PM) [id number]

## 2020-05-28 ENCOUNTER — HOSPITAL ENCOUNTER (OUTPATIENT)
Dept: HOSPITAL 61 - PNCL | Age: 55
End: 2020-05-28
Attending: ANESTHESIOLOGY
Payer: COMMERCIAL

## 2020-05-28 DIAGNOSIS — M51.16: Primary | ICD-10-CM

## 2020-05-28 PROCEDURE — 62323 NJX INTERLAMINAR LMBR/SAC: CPT

## 2020-05-28 NOTE — PAIN
DATE OF SERVICE:  05/28/2020



PROGRESS NOTE FOR PAIN CLINIC



DIAGNOSES:  Lumbar radiculopathy with lumbar herniated disk, lumbar degenerative

disk disease.



HISTORY OF PRESENT ILLNESS:  The patient is a 55-year-old male who returns for

followup status post lumbar epidural steroid injections x 2, most recently

12/26/2019.  The patient did very well with the last injection.  He reports

overall about 30% improvement to this date, still improved, more significantly

improved early on, but now the pain is returning in the low back, bilateral

lower extremities, posterior gluteus, posterior thighs, posterior calves, worse

on the right than the left at this time and with some right knee pain reported

as well.  The patient reports the pain is burning and sharp in the back,

radiating and constant in the legs with activity.  He reports an 8 on a scale of

10 over the last week at all times, average, worst and its least and is an 8

today.  The patient reports no new motor or sensory deficits, no new bowel or

bladder incontinence.  The patient reports it does awaken him from sleep about

3-4 times each night.



PHYSICAL EXAMINATION:

VITAL SIGNS:  The patient's blood pressure 131/85, pulse 68, respirations 18,

temperature is 97.8 degrees Fahrenheit, height is 6 feet 4 inches, weight is 276

pounds.

GENERAL:  The patient is awake, alert, oriented, appropriate, very pleasant

demeanor.

HEENT:  Shows normocephalic, atraumatic.  Extraocular movements are intact and

symmetrical.  Oral cavity:  Mucous membranes moist and pink.  Dentition is

intact.

NECK:  Shows anterior throat supple without palpable lymphadenopathy noted. 

Swallow reflex symmetrical.

CHEST:  Shows normal on inspection.  Breath sounds are clear bilaterally.

HEART:  Shows S1, S2 clear.  No murmurs auscultated.

ABDOMEN:  Soft, nontender, nondistended.  No palpable organomegaly is noted.  No

rebound or guarding demonstrated.

BACK:  Shows spine grossly in the midline.  Normal appearing thoracic kyphosis

and some minor flattening of lumbar lordotic curvature.  Lumbar paraspinous

muscle shows symmetrical on inspection, on palpation shows some moderate

tenderness diffusely bilaterally but only diffusely without significant

radiation.  The patient has good rotational motion of lumbar spine, both

laterally greater than 10 degrees right and left as well as extension greater

than 10 degrees, forward flexion 45 degrees without significant pain.

EXTREMITIES:  Lower extremities show deep tendon reflexes 2+ in the patellar, 1+

tendo-calcaneus tendons.  Motor exam is 5/5 with dorsiflexion, extension,

quadriceps and hamstring flexion symmetrical.  Peripheral pulses are 1+

posterior tibia.  No peripheral edema is noted bilaterally.



Options were discussed with the patient.  The patient's old chart was reviewed

as his current medication regimen updated.  Current review of systems updated

today as well.  We will proceed with a first in this series of lumbar epidural

steroid injection today with fluoroscopic guidance.  Risks were discussed

including but not limited to bleeding, infection, possibility of epidural

hematoma, subsequent neurological compromise, dural puncture, headaches, spinal

cord and/or nerve damage, side effects of steroid medication and poor results

regarding pain control.  The patient understands and wished to proceed.  The

patient will return to clinic in approximately 2 weeks for followup.  He was

counseled on return appointment, activity level and side effects to be aware of.



DIAGNOSES:  Lumbar radiculopathy with lumbar herniated disk, lumbar degenerative

disk disease.



PROCEDURE:  Lumbar epidural steroid injection, translaminar approach at L5-S1

level using C-arm fluoroscopic guidance under sterile prep and drape using local

anesthetic.



MEDICATION INJECTED:  A total of 120 mg Depo-Medrol plus 10 mL of

preservative-free normal saline and 2 mL of contrast.



CONDITION AT DISCHARGE:  Stable.  The patient tolerated procedure well, had no

complications.

 



______________________________

CINTIA LAGUNA MD



DR:  NEDA/eber  JOB#:  [id number] / [id number]

DD:  05/28/2020 08:14  DT:  05/28/2020 08:40

## 2020-09-04 ENCOUNTER — HOSPITAL ENCOUNTER (OUTPATIENT)
Dept: HOSPITAL 61 - PNCL | Age: 55
Discharge: HOME | End: 2020-09-04
Attending: ANESTHESIOLOGY
Payer: COMMERCIAL

## 2020-09-04 DIAGNOSIS — M51.16: Primary | ICD-10-CM

## 2020-09-04 DIAGNOSIS — Z79.899: ICD-10-CM

## 2020-09-04 PROCEDURE — G0463 HOSPITAL OUTPT CLINIC VISIT: HCPCS

## 2020-09-04 PROCEDURE — 99212 OFFICE O/P EST SF 10 MIN: CPT

## 2020-09-04 NOTE — PDOC
Progress Note - Pain Clinic


Date of Service:


DOS:


DATE: 9/4/20 


TIME: 10:43





Diagnosis:


Dx:


Lumbar radiculopathy with lumbar herniated disc and lumbar degenerative disc 

disease





History or Present Illness:


HPI:


55-year-old male returns follow-up status post lumbar epidural steroid injection

x1 most recently seen May 28, 2020.  Patient reports about 75% improvement 

initially now down to about 50% overall but pain lasting improvement over about 

4 months patient reports he is doing much better at the pain returning now 

however in the low back and left lower extremity posterior gluteus posterior 

thigh posterior calf lateral thigh to some extent into the posterior heel 

patient which is a 9 on scale 10 is worst over the past week 8 on average 6 at 

its least is a 6 today.  Patient reports no new motor or sensory deficits 

reports initially was doing much better distance walking doing work activities 

household activities traveling with greater ease and comfort as well.  She 

reports now it wakes him asleep for only rarely and is returning in a similar 

pattern as it was previously.  Patient reports no new motor or sensory deficits 

no new bowel or bladder incontinence or other complaints.





Physical Exam:


VS:


Blood pressure is 124/81 pulse 78 respirations 18 temperature 97.9 F height is 

6 foot 4 inches weight is 277 pounds


PE:


PHYSICAL EXAMINATION:





GENERAL: The patient is awake, alert, oriented, appropriate, very pleasant 

demeanor


HEENT: Shows normocephalic, atraumatic.  Extraocular movements are intact and 

symmetrical.  Oral cavity: Mucous membranes moist and pink. 


NECK: Shows anterior throat supple without palpable lymphadenopathy noted. 


CHEST: Shows normal on inspection.  Breath sounds are clear bilaterally, no 

rales rhonchi or wheezes auscultated.


HEART: Shows S1, S2 clear.  No murmurs auscultated.


ABDOMEN: Soft, nontender, nondistended.  No palpable organomegaly is noted.  No 

rebound or guarding demonstrated.


BACK: Shows spine grossly in the midline.  Normal-appearing cervical lordotic 

curvature.  There is slightly increased thoracic kyphosis, some minor flattening

of the lumbar lordotic curvature.  Lumbar paraspinous muscles show symmetrical 

on inspection, on palpation shows some moderate tenderness diffusely throughout 

the upper, middle and lower distribution of the paraspinous muscles bilaterally 

but without specific trigger points, without radiation of pain.  The patient has

good rotational motion of the lumbar spine, both laterally as well as extension 

and flexion without significant difficulty.  No tenderness over the spinous 

processes, sacrum or sacroiliac regions.


EXTREMITIES: Lower extremities show deep tendon reflexes 2+ in the patellar and 

tendo calcaneus tendons.  Motor exam is 5 on a scale of 5 with right 

dorsiflexion, extension, quadriceps and hamstring flexion and 5/5 on the left.  

Peripheral pulses are 1+ posterior tibial.  No peripheral edema is noted 

bilaterally.  Lower extremities are warm and dry to touch, equal in color and 

appearance.  Straight leg raise noted to be negative on the right, left side is 

positive at about 40 degrees decreased with knee flexion..  Gaenslen's and 

Paulie's maneuvers are negative as well.  


SKIN: Shows warm and dry, good turgor.  No edema.  No sores, rashes or bruising 

throughout.





Procedure:


Procedure:


Options were discussed with the patient.  Patient will chart reviewed his curr

ent medication regimen updated current review of systems updated today as well. 

We will preauthorize patient for a second in the series lumbar epidural steroid 

injections did very well with the last injection with him again about 75% 

improvement now returning the low back left lower extremity in a radicular 

pattern in the L5-S1 dermatomal distribution on the left.  We will plan on a 

translaminar approach at L5-S1 level lumbar epidural steroid injection once 

approved.  In meantime patient will be given a new prescription for meloxicam 15

mg daily as well as Medrol Dosepak with instructions side effects aware with 

each of these.  Patient to follow-up once preauthorization is obtained we will 

plan on lumbar epidural steroid injection at that time.





Medication Injected:


Med Injected:


None





Condition at Discharge:


Condition at Discharge:


Condition at discharge stable











CINTIA LAGUNA MD                Sep 4, 2020 10:47

## 2020-09-18 ENCOUNTER — HOSPITAL ENCOUNTER (OUTPATIENT)
Dept: HOSPITAL 61 - PNCL | Age: 55
Discharge: HOME | End: 2020-09-18
Attending: ANESTHESIOLOGY
Payer: COMMERCIAL

## 2020-09-18 DIAGNOSIS — M51.16: Primary | ICD-10-CM

## 2020-09-18 DIAGNOSIS — Z79.899: ICD-10-CM

## 2020-09-18 PROCEDURE — 62323 NJX INTERLAMINAR LMBR/SAC: CPT

## 2020-09-18 NOTE — PDOC
Progress Note - Pain Clinic


Date of Service:


DOS:


DATE: 9/18/20 


TIME: 09:26





Diagnosis:


Dx:


Lumbar radiculopathy with lumbar herniated disc and lumbar degenerative disc 

disease





History or Present Illness:


HPI:


55-year-old male returns follow-up status post lumbar epidural steroid 

injections most recently May 28, 2020 patient did well with pain returning now 

in the low back and into the left lower extremity as it was previously in the 

posterior gluteus posterior thigh posterior calf worse with walking standing 

changing positions.  Patient ports did very well about 40% improvement for the 

first 2 months after the last injection but now the pain is returning.  Patient 

ports burning sharp pain more constant in the low back and left leg worse at 

night worse with walking standing changing positions patient rates pain as a 9 

on a scale of 10 over the past week at its worst 7 on average 6 at its least and

is a 7 today.  Patient reports initially was doing much better with distance 

walking doing work activities household activities sleeping better at night now 

is waking from sleep about every 3-4 hours.  Patient reports no new motor or 

sensory deficits no new bowel or bladder incontinence or other complaints.





Physical Exam:


VS:


Blood pressure 133/66 pulse 75 respirations 18 temperature is 97.7 F height is 

6 foot 4 inches weight is 277 pounds


PE:


PHYSICAL EXAMINATION:





GENERAL: The patient is awake, alert, oriented, appropriate, very pleasant 

demeanor


HEENT: Shows normocephalic, atraumatic.  Extraocular movements are intact and 

symmetrical.  Oral cavity: Mucous membranes moist and pink. 


NECK: Shows anterior throat supple without palpable lymphadenopathy noted.  

Swallow reflex symmetrical.


CHEST: Shows normal on inspection.  Breath sounds are clear bilaterally, no 

rales rhonchi or wheezes auscultated.


HEART: Shows S1, S2 clear.  No murmurs auscultated.


ABDOMEN: Soft, nontender, nondistended, obese.  No palpable organomegaly is 

noted.  No rebound or guarding demonstrated.


BACK: Shows spine grossly in the midline.  Normal-appearing cervical lordotic 

curvature.  There is slightly increased thoracic kyphosis, some minor flattening

of the lumbar lordotic curvature.  Lumbar paraspinous muscles show symmetrical 

on inspection, on palpation shows some moderate tenderness diffusely throughout 

the upper, middle and lower distribution of the paraspinous muscles bilaterally,

but without specific trigger points, without radiation of pain.  The patient has

good rotational motion of the lumbar spine, both laterally as well as extension 

and flexion without significant difficulty.  No tenderness over the spinous 

processes, sacrum or sacroiliac regions.


EXTREMITIES: Lower extremities show deep tendon reflexes 2+ in the patellar and 

tendo calcaneus tendons.  Motor exam is 5 on a scale of 5 with right 

dorsiflexion, extension, quadriceps and hamstring flexion and 5/5 on the left.  

Peripheral pulses are 1+ posterior tibial.  No peripheral edema is noted 

bilaterally.  Lower extremities are warm and dry to touch, equal in color and 

appearance.  


SKIN: Shows warm and dry, good turgor.  No edema.  No sores, rashes or bruising 

throughout.





Procedure:


Procedure:


Options were discussed with the patient.  Patient's old chart was reviewed his 

his current medication regimen updated current review of systems updated today 

as well.  We will proceed with lumbar epidural steroid injection today with 

fluoroscopic guidance.  Risks were discussed including but not limited to: 

Bleeding, infection, possibility of epidural hematoma and subsequent 

neurological compromise, dural puncture, headaches, spinal cord and/or nerve da

mage, side effects of steroid medication, and poor results regarding pain 

control.  Patient understands wished to proceed.  Patient return to clinic in 

approximate 2 weeks for follow-up was counseled as to return appointment 

activity level and side effects to be aware of.





Medication Injected:


Med Injected:


Procedure is lumbar epidural steroid injection under local anesthetic using 

sterile prep and drape at the L5-S1 level using C-arm fluoroscopic guidance in 

both AP and lateral views medications injected is 120 mg Depo-Medrol + 10 mL 

preservative-free normal saline and 2 mL contrast- condition at discharge is 

stable patient tolerated procedure well had no complications.





Condition at Discharge:


Condition at Discharge:


Condition at discharge stable patient tolerated procedure well had no 

complications.











CINTIA LAGUNA MD               Sep 18, 2020 09:30

## 2021-01-13 ENCOUNTER — HOSPITAL ENCOUNTER (OUTPATIENT)
Dept: HOSPITAL 61 - PNCL | Age: 56
Discharge: HOME | End: 2021-01-13
Attending: ANESTHESIOLOGY
Payer: COMMERCIAL

## 2021-01-13 DIAGNOSIS — M51.16: Primary | ICD-10-CM

## 2021-01-13 DIAGNOSIS — F17.210: ICD-10-CM

## 2021-01-13 DIAGNOSIS — Z79.899: ICD-10-CM

## 2021-01-13 PROCEDURE — 62323 NJX INTERLAMINAR LMBR/SAC: CPT

## 2021-01-13 NOTE — PDOC
Progress Note - Pain Clinic


Date of Service:


DOS:


DATE: 1/13/21 


TIME: 08:47





Diagnosis:


Dx:


Lumbar radiculopathy with lumbar degenerative disc disease and lumbar herniated 

disc





History or Present Illness:


HPI:


55-year-old male returns follow-up status post lumbar epidural steroid injection

x2 last seen September 18, 2020 patient did very well with about a 90% 

improvement initially now down about 40% improvement patient ports pain the low 

back and neck right greater than left lower extremity at this time last time his

left leg was more noticeable but is much better his right leg is more noticeable

now with pain rating the posterior gluteus posterior thigh posterior calf and 

foot patient the top of the foot which has some numbness and tingling as well 

patient reports some mild pain on the left side in the posterior gluteus and 

thigh but only to the knee patient reports is a 9 on scale 10 is worse over the 

past week 8 on average 6 its least and is an 8 today.  Patient ports sharp 

shooting burning stabbing constant severe in the low back with walking standing 

changing positions better with sitting or laying down initially doing much 

better with distance walking doing household activities work activities travel 

with greater ease and comfort patient reports is beginning to awaken him sleep 

now about every 5 hours patient reports no new motor or sensory deficits no new 

bowel or bladder incontinence or other complaints.





Physical Exam:


VS:


Blood pressure is 125/71 pulse 84 respirations are 18 temperature 97.4 F height

is 6 foot 4 inches weight is 273 pounds


PE:


PHYSICAL EXAMINATION:





GENERAL: The patient is awake, alert, oriented, appropriate, very pleasant 

demeanor


HEENT: Shows normocephalic, atraumatic.  Extraocular movements are intact and 

symmetrical.  Oral cavity: Mucous membranes moist and pink.  


NECK: Shows anterior throat supple without palpable lymphadenopathy noted.  

Swallow reflex symmetrical.


CHEST: Shows normal on inspection.  Breath sounds are clear bilaterally, no r

ales or rhonchi.


HEART: Shows S1, S2 clear.  No murmurs auscultated.


ABDOMEN: Soft, nontender, nondistended, obese.  No palpable organomegaly is 

noted.  No rebound or guarding demonstrated.


BACK: Shows spine grossly in the midline.  Normal-appearing cervical lordotic 

curvature.  There is slightly increased thoracic kyphosis, some minor flattening

of the lumbar lordotic curvature.  Lumbar paraspinous muscles show symmetrical 

on inspection, on palpation shows some moderate tenderness diffusely throughout 

the upper, middle and lower distribution of the paraspinous muscles without 

specific trigger points, without radiation of pain.  The patient has good 

rotational motion of the lumbar spine, both laterally as well as extension and 

flexion without significant difficulty.  No tenderness over the spinous 

processes, sacrum or sacroiliac regions.


EXTREMITIES: Lower extremities show deep tendon reflexes 2+ in the patellar and 

tendo calcaneus tendons.  Motor exam is 5 on a scale of 5 with right 

dorsiflexion, extension, quadriceps and hamstring flexion and 5/5 on the left.  

Peripheral pulses are 1+ posterior tibial.  No peripheral edema is noted 

bilaterally.  Lower extremities are warm and dry to touch, equal in color and 

appearance. 


SKIN: Shows warm and dry, good turgor.  No edema.  No sores, rashes or bruising 

throughout.





Procedure:


Procedure:


Options were discussed with the patient.  Patient will chart reviews his current

medication regimen updated current review of systems updated today as well.  We 

will proceed with a third in the series lumbar epidural steroid injection today 

with fluoroscopic guidance.  Risks were discussed including but not limited to: 

Bleeding, infection, possibility of epidural hematoma and subsequent 

neurological compromise, dural puncture, headaches, spinal cord and/or nerve 

damage, side effects of steroid medication, and poor results regarding pain 

control.  Patient understands wished to proceed.  Patient will return to clinic 

in approximate 2 weeks for follow-up was counseled as to return appointment 

activity level and side effects to be aware of.





Medication Injected:


Med Injected:


Procedure is lumbar epidural steroid injection under local anesthetic using 

sterile prep and drape at the L5-S1 level using C-arm fluoroscopic guidance in 

both AP and lateral views medications injected is 120 mg Depo-Medrol + 10 mL 

preservative-free normal saline and 2 mL contrast- condition at discharge is 

stable patient tolerated procedure well had no complications.





Condition at Discharge:


Condition at Discharge:


Condition at discharge stable, patient tolerated procedure well and had no 

complications.











CINTIA LAGUNA MD               Jan 13, 2021 08:49

## 2021-03-05 ENCOUNTER — HOSPITAL ENCOUNTER (OUTPATIENT)
Dept: HOSPITAL 61 - PNCL | Age: 56
Discharge: HOME | End: 2021-03-05
Attending: ANESTHESIOLOGY
Payer: COMMERCIAL

## 2021-03-05 DIAGNOSIS — M51.16: Primary | ICD-10-CM

## 2021-03-05 DIAGNOSIS — Z79.899: ICD-10-CM

## 2021-03-05 DIAGNOSIS — F17.210: ICD-10-CM

## 2021-03-05 PROCEDURE — 62323 NJX INTERLAMINAR LMBR/SAC: CPT

## 2021-03-05 NOTE — PDOC
Progress Note - Pain Clinic


Date of Service:


DOS:


DATE: 3/5/21 


TIME: 08:55





Diagnosis:


Dx:


Lumbar radiculopathy with lumbar degenerative disease and lumbar herniated disc





History or Present Illness:


HPI:


56-year-old male returns for follow-up status post lumbar epidural steroid 

injection x3.  Last seen January 13, 2021.  Patient reports he did very well 

with near 80% improvement initially now down about 30% improvement overall with 

pain returning in his low back and mostly in the right lower extremity posterior

gluteus posterior thigh posterior calf some on the left side as well but mostly 

on the right.  Patient reports is worse with walking standing changing positions

getting in and out of his car difficulty sleeping over the past few weeks only 

prior to that he was doing much better with distance walking doing household 

activities work activities with greater ease and comfort travel with greater e

ase as well patient rates his pain as a 9 on scale 10 is worst over the past 

week 7 on average 5 its least is a 7 today.  Patient scribes as sharp and dull 

in the back shooting in the right greater than left lower extremity burning and 

stabbing the leg as well, more constant with walking and standing.  Patient 

reports no new motor or sensory deficits no new bowel or bladder incontinence or

other complaints.





Physical Exam:


VS:


Blood pressure is 121/76 pulse 73 respirations 18 temperature 97.7 degrees.  6 

foot 4 inches weight is 264 pounds


PE:


PHYSICAL EXAMINATION:





GENERAL: The patient is awake, alert, oriented, appropriate, very pleasant 

demeanor


HEENT: Shows normocephalic, atraumatic.  Extraocular movements are intact and 

symmetrical.  Oral cavity: Mucous membranes moist and pink.  Dentition is 

intact.


NECK: Shows anterior throat supple without palpable lymphadenopathy noted.  

Swallow reflex symmetrical.


CHEST: Shows normal on inspection.  Breath sounds are clear bilaterally, no 

rales or rhonchi bilaterally.


HEART: Shows S1, S2 clear.  No murmurs auscultated.


ABDOMEN: Soft, nontender, nondistended, obese.  No palpable organomegaly is 

noted. 


BACK: Shows spine grossly in the midline.  Normal-appearing cervical lordotic 

curvature.  There is increased thoracic kyphosis, some flattening of the lumbar 

lordotic curvature.  Lumbar paraspinous muscles show symmetrical on inspection, 

on palpation shows some moderate tenderness diffusely throughout the upper, 

middle and lower distribution of the paraspinous muscles, but without specific 

trigger points, without radiation of pain.  The patient has good rotational 

motion of the lumbar spine, both laterally as well as extension and flexion 

without significant difficulty.  


EXTREMITIES: Lower extremities show deep tendon reflexes 2+ in the patellar and 

tendo calcaneus tendons.  Motor exam is 5 on a scale of 5 with right 

dorsiflexion, extension, quadriceps and hamstring flexion and 5/5 on the left.  

Peripheral pulses are 1+ posterior tibial.  No peripheral edema is noted 

bilaterally.  Lower extremities are warm and dry to touch, equal in color and 

appearance.


SKIN: Shows warm and dry, good turgor.  No edema.  No sores, rashes or bruising 

throughout.





Procedure:


Procedure:


Options discussed with the patient.  Patient will chart reviews his current 

medication regimen updated current review of systems updated today as well.  We 

will proceed with a first in the series lumbar epidural steroid injection today 

with fluoroscopic guidance.  Risks were discussed including but not limited to: 

Bleeding, infection, possibility of epidural hematoma and subsequent 

neurological compromise, dural puncture, headaches, spinal cord and/or nerve 

damage, side effects of steroid medication, and poor results regarding pain 

control.  Patient understands and wished to proceed.  Patient will return to the

clinic in approximate 2 weeks for follow-up, was counseled as to return 

appointment activity level and side effects to be aware of.





Medication Injected:


Med Injected:


Procedure is lumbar epidural steroid injection under local anesthetic using 

sterile prep and drape at the L5-S1 level using C-arm fluoroscopic guidance in 

both AP and lateral views medications injected is 120 mg Depo-Medrol + 10 mL 

preservative-free normal saline and 2 mL contrast- condition at discharge is 

stable patient tolerated procedure well had no complications.





Condition at Discharge:


Condition at Discharge:


Condition at discharge stable, patient tolerated procedure well and had no 

complications.











CINTIA LAGUNA MD                Mar 5, 2021 08:58

## 2021-03-05 NOTE — PDOC4
PROCEDURE


Procedure


Patient was consented for lumbar epidural steroid injection.  Risks were dis

cussed including but not limited to: Bleeding, infection, possibility of 

epidural hematoma and subsequent neurological compromise, dural puncture, 

headaches, spinal cord and/or nerve damage, side effects of steroid medication, 

and poor results regarding pain control.  Patient understands and wished to 

proceed.


Procedure is lumbar epidural steroid injection under local anesthetic using 

sterile prep and drape at the L5-S1 level using C-arm fluoroscopic guidance in 

both AP and lateral views medications injected is 120 mg Depo-Medrol + 10 mL 

preservative-free normal saline and 2 mL contrast- condition at discharge is 

stable patient tolerated procedure well had no complications.











CINTIA LAGUNA MD                Mar 5, 2021 08:59

## 2021-05-04 ENCOUNTER — HOSPITAL ENCOUNTER (OUTPATIENT)
Dept: HOSPITAL 61 - PNCL | Age: 56
Discharge: HOME | End: 2021-05-04
Attending: ANESTHESIOLOGY
Payer: COMMERCIAL

## 2021-05-04 DIAGNOSIS — M51.16: Primary | ICD-10-CM

## 2021-05-04 DIAGNOSIS — Z79.899: ICD-10-CM

## 2021-05-04 DIAGNOSIS — F17.210: ICD-10-CM

## 2021-05-04 PROCEDURE — 62323 NJX INTERLAMINAR LMBR/SAC: CPT

## 2021-05-04 NOTE — PDOC
Progress Note - Pain Clinic


Date of Service:


DOS:


DATE: 5/4/21 


TIME: 08:13





Diagnosis:


Dx:


Lumbar radiculopathy with lumbar degenerative disease and lumbar herniated disc





History or Present Illness:


HPI:


56-year-old male returns follow-up status post lumbar epidural steroid injection

x1.  Patient reports he did very well for the first month then pain began to 

return for initially about 80% improvement but now down to about 20% overall 

pain patient reports pain in the low back and bilateral lower extremities 

somewhat worse on the right than the left at this time but present bilaterally. 

Patient reports no new motor or sensory deficits with still significant pain 

with walking standing changing positions sitting for prolonged periods.  Patient

reports pain sharp burning and stabbing the back constant and radiating can be 

severe and unbearable with extended walking standing better with sitting or 

laying down but has been waking from sleep about every 4-6 hours patient reports

he can usually reposition and get back to sleep.  Patient rates his pain is a 9 

on scale 10 is worse in the past week 8 on average 7 its least and 8 8 today 

patient reports burning stabbing sharp constant and severe patient reports no 

new motor or sensory deficits no bowel or bladder incontinence.





Physical Exam:


VS:


Blood pressure is 121/86 pulse 54 respirations are 18 temperature 97.8 F height

is 6 foot 4 inches weight 268 pounds


PE:


PHYSICAL EXAMINATION:





GENERAL: The patient is awake, alert, oriented, appropriate, very pleasant 

demeanor


HEENT: Shows normocephalic, atraumatic.  Extraocular movements are intact and 

symmetrical.  Oral cavity: Mucous membranes moist and pink.  Dentition is 

intact.


NECK: Shows anterior throat supple without palpable lymphadenopathy noted.  

Swallow reflex symmetrical.


CHEST: Shows normal on inspection.  Breath sounds are clear bilaterally.


HEART: Shows S1, S2 clear.  No murmurs auscultated.


ABDOMEN: Soft, nontender, nondistended, obese.  No palpable organomegaly is 

noted.  No rebound or guarding demonstrated.


BACK: Shows spine grossly in the midline.  Normal-appearing cervical lordotic 

curvature.  There is slightly increased thoracic kyphosis, some minor flattening

of the lumbar lordotic curvature.  Lumbar paraspinous muscles show symmetrical 

on inspection, on palpation shows some moderate tenderness diffusely throughout 

the upper, middle and lower distribution of the paraspinous muscles without 

specific trigger points, without radiation of pain.  The patient has good 

rotational motion of the lumbar spine, both laterally as well as extension and 

flexion without significant difficulty.  No tenderness over the spinous 

processes, sacrum or sacroiliac regions.


EXTREMITIES: Lower extremities show deep tendon reflexes 2+ in the patellar and 

tendo calcaneus tendons.  Motor exam is 5 on a scale of 5 with right 

dorsiflexion, extension, quadriceps and hamstring flexion and 5/5 on the left.  

Peripheral pulses are 1+ posterior tibial.  No peripheral edema is noted 

bilaterally.  Lower extremities are warm and dry to touch, equal in color and 

appearance.


SKIN: Shows warm and dry, good turgor.  No edema.  No sores, rashes or bruising 

throughout.





Procedure:


Procedure:


Options discussed with the patient.  Patient's old chart was viewed as his 

current review of systems updated and current medication list updated.  We will 

proceed with a second in the series lumbar epidural steroid injection today with

fluoroscopic guidance.  Risks were discussed including but not limited to: 

Bleeding, infection, possibility of epidural hematoma and subsequent 

neurological compromise, dural puncture, headaches, spinal cord and/or nerve 

damage, side effects of steroid medication, and poor results regarding pain 

control.  Patient understands and wished to proceed.  Patient will return to the

clinic in approximate 2 weeks for follow-up, was counseled as to return 

appointment activity level and side effects to be aware of.





Medication Injected:


Med Injected:


Procedure is lumbar epidural steroid injection under local anesthetic using 

sterile prep and drape at the L5-S1 level using C-arm fluoroscopic guidance in 

both AP and lateral views medications injected is 120 mg Depo-Medrol + 10 mL 

preservative-free normal saline and 2 mL contrast- condition at discharge is 

stable patient tolerated procedure well had no complications.





Condition at Discharge:


Condition at Discharge:


Condition at discharge stable, patient already procedure well and had no 

complications.











CINTIA LAGUNA MD                May 4, 2021 08:17

## 2021-05-04 NOTE — PDOC4
PROCEDURE


Procedure


Patient was consented for lumbar epidural steroid injection.  Risks were dis

cussed including but not limited to: Bleeding, infection, possibility of 

epidural hematoma and subsequent neurological compromise, dural puncture, 

headaches, spinal cord and/or nerve damage, side effects of steroid medication, 

and poor results regarding pain control.  Patient understands and wished to 

proceed.


Procedure is lumbar epidural steroid injection under local anesthetic using 

sterile prep and drape at the L5-S1 level using C-arm fluoroscopic guidance in 

both AP and lateral views medications injected is 120 mg Depo-Medrol + 10 mL 

preservative-free normal saline and 2 mL contrast- condition at discharge is 

stable patient tolerated procedure well had no complications.











CINTIA LAGUNA MD                May 4, 2021 08:18

## 2021-07-13 ENCOUNTER — HOSPITAL ENCOUNTER (OUTPATIENT)
Dept: HOSPITAL 61 - PNCL | Age: 56
Discharge: HOME | End: 2021-07-13
Attending: ANESTHESIOLOGY
Payer: COMMERCIAL

## 2021-07-13 DIAGNOSIS — Z79.899: ICD-10-CM

## 2021-07-13 DIAGNOSIS — F17.210: ICD-10-CM

## 2021-07-13 DIAGNOSIS — M51.16: Primary | ICD-10-CM

## 2021-07-13 PROCEDURE — 62323 NJX INTERLAMINAR LMBR/SAC: CPT

## 2021-07-13 NOTE — PDOC
Progress Note - Pain Clinic


Date of Service:


DOS:


DATE: 7/13/21 


TIME: 08:16





Diagnosis:


Dx:


Lumbar radiculopathy with lumbar herniated disc and lumbar degenerative disc 

disease





History or Present Illness:


HPI:


56-year-old male returns for follow-up status post lumbar epidural steroid 

ejections x2 last seen May 4, 2021.  Patient reports he did very well initially 

about 75% improvement but now about 20% improvement overall pain returning now 

more in the right leg than the left leg but present bilaterally in the bilateral

lower extremities posterior gluteus posterior thighs posterior calves more 

noticeable on the right than the left and additionally have been worse on the 

left side patient MRI was reviewed with him today as well also patient taking 

meloxicam which helps by about 10 to 20% as well patient reports pain is sharp 

and shooting in the right lower extremity as well as left lower extremity bur

chelsie and stabbing in the back can be constant severe rated as an 8 on scale 10 

is worse over the past week 7 on average 6 its least is a 7 today.  Patient 

reports better with sitting or laying down wakes him from sleep about once every

5 hours and patient reports increased spasticity in the back and the legs as 

well with some muscle cramps and spastic sensation in the back and legs.  

Patient reports no new motor or sensory deficits no bowel or bladder 

incontinence.





Physical Exam:


VS:


Blood pressure is 135/75 pulse 78 respirations 18 temperature is 97.9 F height 

6 foot 4 inches weight is 271 pounds


PE:


PHYSICAL EXAMINATION:





GENERAL: The patient is awake, alert, oriented, appropriate, very pleasant in d

emeanor


HEENT: Shows normocephalic, atraumatic.  Extraocular movements are intact and 

symmetrical.  Oral cavity: Mucous membranes moist and pink.  


NECK: Shows anterior throat supple without palpable lymphadenopathy noted.  

Swallow reflex symmetrical.


CHEST: Shows normal on inspection.  Breath sounds are clear bilaterally.


HEART: Shows S1, S2 clear.  No murmurs auscultated.


ABDOMEN: Soft, nontender, nondistended, obese.  


BACK: Shows spine grossly in the midline.  Normal-appearing cervical lordotic 

curvature.  There is slightly increased thoracic kyphosis, some minor flattening

of the lumbar lordotic curvature.  Lumbar paraspinous muscles show symmetrical 

on inspection, on palpation shows some moderate tenderness diffusely throughout 

the upper, middle and lower distribution of the paraspinous muscles, without 

specific trigger points, without radiation of pain.  The patient has good r

otational motion of the lumbar spine, both laterally as well as extension and 

flexion without significant difficulty.  


EXTREMITIES: Lower extremities show deep tendon reflexes 2+ in the patellar and 

tendo calcaneus tendons.  Motor exam is 5 on a scale of 5 with right 

dorsiflexion, extension, quadriceps and hamstring flexion and 5/5 on the left.  

Peripheral pulses are 1+ posterior tibial.  No peripheral edema is noted 

bilaterally.  Lower extremities are warm and dry.


SKIN: Shows warm and dry, good turgor.  No edema.  No sores, rashes or bruising 

throughout.





Procedure:


Procedure:


Options were discussed with the patient.  Patient's old chart reviewed his 

current medication regimen updated current review of systems updated today as 

well.  We will proceed with a third in the series lumbar epidural steroid 

injection stable fluoroscopic guidance.  Risks were discussed including but not 

limited to: Bleeding, infection, possibility of epidural hematoma and subsequent

neurological compromise, dural puncture, headaches, spinal cord and/or nerve 

damage, side effects of steroid medication, and poor results regarding pain 

control.  Patient understands and wished to proceed.  Also will add new 

medication of Flexeril 10 mg 3 times daily, p.o starting nightly initially.  

Patient continue with meloxicam once daily 15 mg if he feels this does help to a

moderate extent.





Medication Injected:


Med Injected:


Procedure is lumbar epidural steroid injection under local anesthetic using 

sterile prep and drape at the L5-S1 level using C-arm fluoroscopic guidance in 

both AP and lateral views medications injected is 120 mg Depo-Medrol +10mL 

preservative-free normal saline and 2 mL contrast- condition at discharge is 

stable patient tolerated procedure well had no complications.





Condition at Discharge:


Condition at Discharge:


Condition at discharge stable, patient already procedure well and had no 

complications.











CINTIA LAGUNA MD               Jul 13, 2021 08:21

## 2021-07-13 NOTE — PDOC4
Procedure Note:


Procedure Note:


Patient was consented for lumbar epidural steroid injection.  Risks were 

discussed including but not limited to: Bleeding, infection, possibility of 

epidural hematoma and subsequent neurological compromise, dural puncture, 

headaches, spinal cord and/or nerve damage, side effects of steroid medication, 

and poor results regarding pain control.  Patient understands and wished to 

proceed.


Procedure is lumbar epidural steroid injection under local anesthetic using 

sterile prep and drape at the L5-S1 level using C-arm fluoroscopic guidance in 

both AP and lateral views medications injected is 120 mg Depo-Medrol +10mL 

preservative-free normal saline and 2 mL contrast- condition at discharge is 

stable patient tolerated procedure well had no complications.











CINTIA LAGUNA MD               Jul 13, 2021 08:21

## 2022-01-18 ENCOUNTER — HOSPITAL ENCOUNTER (OUTPATIENT)
Dept: HOSPITAL 61 - PNCL | Age: 57
Discharge: HOME | End: 2022-01-18
Attending: ANESTHESIOLOGY
Payer: COMMERCIAL

## 2022-01-18 DIAGNOSIS — Z79.899: ICD-10-CM

## 2022-01-18 DIAGNOSIS — F17.210: ICD-10-CM

## 2022-01-18 DIAGNOSIS — M51.16: Primary | ICD-10-CM

## 2022-01-18 PROCEDURE — 62322 NJX INTERLAMINAR LMBR/SAC: CPT

## 2022-01-18 PROCEDURE — 62323 NJX INTERLAMINAR LMBR/SAC: CPT

## 2022-01-18 NOTE — PDOC4
Procedure Note:


ICD 10 Code:


ICD 10 Code:


M54.17


M51.87





Procedure Note:


Patient was consented for lumbar epidural steroid injection with fluoroscopic 

guidance.  Risks were discussed including but not limited to: Bleeding, 

infection, possibility of epidural hematoma and subsequent neurological 

compromise, dural puncture, headaches, spinal cord and/or nerve damage, side 

effects of steroid medication, and poor results regarding pain control.  Patient

understands and wished to proceed.


Procedure is lumbar epidural steroid injection under local anesthetic using 

sterile prep and drape at the L5-S1 level using C-arm fluoroscopic guidance in 

both AP and lateral views medications injected is 120 mg Depo-Medrol +10mL 

preservative-free normal saline and 2 mL contrast- condition at discharge is 

stable patient tolerated procedure well had no complications.











CINTIA LAGUNA MD               Jan 18, 2022 08:20

## 2022-01-18 NOTE — PDOC
Progress Note - Pain Clinic


Date of Service:


DOS:


DATE: 1/18/22 


TIME: 08:14





Diagnosis:


Dx:


Lumbar radiculopathy with lumbar herniated disc and lumbar degenerative disc 

disease





History or Present Illness:


HPI:


56-year-old male returns for follow-up last seen July 13, 2021 patient did very 

well after lumbar epidural steroid injection with pain in the low back and 

bilateral lower extremities.  Patient reports about 80% improvement initially 

now down about 40% improvement over the past month or so patient reports the 

first 2 months to do much better increased distance walking doing household 

activities travel with greater ease and comfort sleeping better patient still 

taking Flexeril which we have started on him in July as well mostly at night it 

does help him sleep patient reports occasional take during the day but does 

cause him drowsiness.  Patient reports the pain now is changed as is across the 

low back but is in the right lower extremity more significantly than previously 

where it was only in his back patient report is now on the right leg posterior 

gluteus posterior thigh posterior calf lateral thigh and calf as well into the 

ankle and foot worse with walking standing change positions patient reports the 

left leg is doing fine oriented literally had some pain on the left side is now 

gone well on the right side now rated as a 9 on scale 10 is worst at 8 on 

average 6 its least is a 7 today.  Patient reports no bowel or bladder 

incontinence no loss of motor function, but has significant fatigability in the 

right lower extremity with walking and standing, as well as changing positions. 

Patient reports no bowel or bladder incontinence.





Physical Exam:


VS:


Blood pressure is 119/89 pulse is 85 respirations 16 temperature 97.9 F height 

is 6 foot 4 inches weight is 268 pounds.


PE:


PHYSICAL EXAMINATION:





GENERAL: The patient is awake, alert, oriented, appropriate, very pleasant in 

demeanor


HEENT: Shows normocephalic, atraumatic.  Extraocular movements are intact and 

symmetrical.  Oral cavity: Mucous membranes moist and pink.  Dentition is 

intact.


NECK: Shows anterior throat supple without palpable lymphadenopathy noted.  

Swallow reflex symmetrical.


CHEST: Shows normal on inspection.  Breath sounds are clear bilaterally, distant

but no rales or rhonchi.


HEART: Shows S1, S2 clear.  No murmurs auscultated.


ABDOMEN: Soft, nontender, nondistended.  No palpable organomegaly is noted.


BACK: Shows spine grossly in the midline.  Normal-appearing cervical lordotic 

curvature.  There is mildly increased thoracic kyphosis, some minor flattening 

of the lumbar lordotic curvature.  Lumbar paraspinous muscles show symmetrical 

on inspection, on palpation shows some moderate tenderness diffusely throughout 

the upper, middle and lower distribution of the paraspinous muscles, but without

specific trigger points, without radiation of pain.  The patient has good 

rotational motion of the lumbar spine, both laterally as well as extension and 

flexion without significant difficulty.  No tenderness over the spinous 

processes, sacrum or sacroiliac regions.


EXTREMITIES: Lower extremities show deep tendon reflexes 2+ in the patellar and 

tendo calcaneus tendons.  Motor exam is 5 on a scale of 5 with right 

dorsiflexion, extension, quadriceps and hamstring flexion and 5/5 on the left.  

Peripheral pulses are 1+ posterior tibial.  No peripheral edema is noted 

bilaterally.  Lower extremities are warm and dry to touch, equal in color and 

appearance.  Straight leg raise noted to be positive on the right only at about 

35 degrees decreased with knee flexion.


SKIN: Shows warm and dry, good turgor.  No edema.  No sores, rashes or bruising 

throughout.





Procedure:


Procedure:


Options were discussed with the patient.  Patient chart reviews his current 

medication regimen updated current review of systems updated today as well.  We 

will proceed with lumbar epidural steroid injection today with fluoroscopic 

guidance.  Risks were discussed including but not limited to: Bleeding, 

infection, possibility of epidural hematoma and subsequent neurological 

compromise, dural puncture, headaches, spinal cord and/or nerve damage, side 

effects of steroid medication, and poor results regarding pain control.  Patient

understands and wished to proceed.  Return to clinic in approximately 4 weeks 

for follow-up, was counseled as to return appointment, activity level, and side 

effects to be aware of.





Medication Injected:


Med Injected:


Procedure is lumbar epidural steroid injection under local anesthetic using 

sterile prep and drape at the L5-S1 level using C-arm fluoroscopic guidance in 

both AP and lateral views medications injected is 120 mg Depo-Medrol +10mL 

preservative-free normal saline and 2 mL contrast- condition at discharge is 

stable patient tolerated procedure well had no complications.





Condition at Discharge:


Condition at Discharge:


Condition at discharge stable, paced tolerated procedure well and had no 

complications.











CINTIA LAGUNA MD               Jan 18, 2022 08:20